# Patient Record
Sex: MALE | Race: WHITE | NOT HISPANIC OR LATINO | Employment: FULL TIME | URBAN - METROPOLITAN AREA
[De-identification: names, ages, dates, MRNs, and addresses within clinical notes are randomized per-mention and may not be internally consistent; named-entity substitution may affect disease eponyms.]

---

## 2017-11-05 ENCOUNTER — OFFICE VISIT (OUTPATIENT)
Dept: URGENT CARE | Facility: CLINIC | Age: 37
End: 2017-11-05
Payer: COMMERCIAL

## 2017-11-05 VITALS
OXYGEN SATURATION: 96 % | DIASTOLIC BLOOD PRESSURE: 68 MMHG | WEIGHT: 315 LBS | HEIGHT: 72 IN | TEMPERATURE: 98 F | SYSTOLIC BLOOD PRESSURE: 110 MMHG | RESPIRATION RATE: 18 BRPM | BODY MASS INDEX: 42.66 KG/M2 | HEART RATE: 91 BPM

## 2017-11-05 DIAGNOSIS — M54.32 SCIATICA OF LEFT SIDE: ICD-10-CM

## 2017-11-05 PROCEDURE — 99204 OFFICE O/P NEW MOD 45 MIN: CPT | Mod: 25 | Performed by: PHYSICIAN ASSISTANT

## 2017-11-05 RX ORDER — KETOROLAC TROMETHAMINE 30 MG/ML
60 INJECTION, SOLUTION INTRAMUSCULAR; INTRAVENOUS ONCE
Status: COMPLETED | OUTPATIENT
Start: 2017-11-05 | End: 2017-11-05

## 2017-11-05 RX ORDER — CYCLOBENZAPRINE HCL 10 MG
10 TABLET ORAL 3 TIMES DAILY PRN
Qty: 30 TAB | Refills: 0 | Status: SHIPPED | OUTPATIENT
Start: 2017-11-05

## 2017-11-05 RX ORDER — HYDROXYZINE HYDROCHLORIDE 25 MG/1
25 TABLET, FILM COATED ORAL 3 TIMES DAILY PRN
COMMUNITY

## 2017-11-05 RX ORDER — PREDNISONE 20 MG/1
TABLET ORAL
Qty: 10 TAB | Refills: 0 | Status: SHIPPED | OUTPATIENT
Start: 2017-11-05

## 2017-11-05 RX ORDER — ATORVASTATIN CALCIUM 20 MG/1
20 TABLET, FILM COATED ORAL NIGHTLY
COMMUNITY

## 2017-11-05 RX ORDER — LISINOPRIL 20 MG/1
20 TABLET ORAL DAILY
COMMUNITY

## 2017-11-05 RX ORDER — HYDROCODONE BITARTRATE AND ACETAMINOPHEN 5; 325 MG/1; MG/1
1 TABLET ORAL EVERY 6 HOURS PRN
Qty: 15 TAB | Refills: 0 | Status: SHIPPED | OUTPATIENT
Start: 2017-11-05

## 2017-11-05 RX ORDER — FLUTICASONE PROPIONATE 50 MCG
1 SPRAY, SUSPENSION (ML) NASAL DAILY
COMMUNITY

## 2017-11-05 RX ADMIN — KETOROLAC TROMETHAMINE 60 MG: 30 INJECTION, SOLUTION INTRAMUSCULAR; INTRAVENOUS at 12:10

## 2017-11-05 ASSESSMENT — ENCOUNTER SYMPTOMS
HEADACHES: 0
TINGLING: 1
MYALGIAS: 1
VOMITING: 0
WEAKNESS: 0
BOWEL INCONTINENCE: 0
CHILLS: 0
FOCAL WEAKNESS: 0
NAUSEA: 0
FEVER: 0
NUMBNESS: 0
BACK PAIN: 1
SENSORY CHANGE: 0
FLANK PAIN: 0
COUGH: 0
PERIANAL NUMBNESS: 0
PALPITATIONS: 0
SHORTNESS OF BREATH: 0
ABDOMINAL PAIN: 0
LEG PAIN: 1
PARESIS: 0
PARESTHESIAS: 0
DIZZINESS: 0

## 2017-11-05 NOTE — PROGRESS NOTES
Subjective:      Rao Paulson is a 37 y.o. male who presents with Other (Left side sciatic pain x3 days)            Back Pain   This is a new problem. Episode onset: 3 days. The problem occurs constantly. The problem has been gradually worsening since onset. The pain is present in the lumbar spine and gluteal (left lumbar spina and left gluteal region). The quality of the pain is described as aching. Radiates to: down left leg. The pain is at a severity of 10/10. The symptoms are aggravated by sitting and bending. Associated symptoms include leg pain and tingling (tingling down left leg). Pertinent negatives include no abdominal pain, bladder incontinence, bowel incontinence, chest pain, dysuria, fever, headaches, numbness, paresis, paresthesias, perianal numbness or weakness. Risk factors include obesity. He has tried NSAIDs, chiropractic manipulation and ice for the symptoms. The treatment provided no relief.     The patient's BP is elevated today due to his severe pain. He denies any symptoms related to his BP. He denies headache, nausea, vision changes, chest pain, shortness of breath, lower extremity edema.     Past Medical History:   Diagnosis Date   • Anxiety    • Hypercholesterolemia      History reviewed. No pertinent surgical history.    History reviewed. No pertinent family history.    Allergies   Allergen Reactions   • Codeine Vomiting       Medications, Allergies, and current problem list reviewed today in Epic      Review of Systems   Constitutional: Negative for chills, fever and malaise/fatigue.   HENT: Negative for ear pain.    Respiratory: Negative for cough and shortness of breath.    Cardiovascular: Negative for chest pain, palpitations and leg swelling.   Gastrointestinal: Negative for abdominal pain, bowel incontinence, nausea and vomiting.   Genitourinary: Negative for bladder incontinence, dysuria and flank pain.        No urinary or bowel incontinence   Musculoskeletal: Positive for back  pain and myalgias.   Neurological: Positive for tingling (tingling down left leg). Negative for dizziness, sensory change, focal weakness, weakness, numbness, headaches and paresthesias.     All other systems reviewed and are negative.        Objective:     /68   Pulse 91   Temp 36.7 °C (98 °F)   Resp 18   Ht 1.829 m (6')   Wt (!) 175.5 kg (387 lb)   SpO2 96%   BMI 52.49 kg/m²      Physical Exam   Constitutional: He is oriented to person, place, and time. He appears well-developed. He appears distressed (patient appears to be in a lot of pain).   Morbidly obese   Eyes: Conjunctivae and EOM are normal. Pupils are equal, round, and reactive to light.   Cardiovascular: Normal rate, regular rhythm and normal heart sounds.  Exam reveals no gallop and no friction rub.    No murmur heard.  Pulmonary/Chest: Effort normal and breath sounds normal. No respiratory distress. He has no wheezes. He has no rales.   Musculoskeletal:        Lumbar back: He exhibits decreased range of motion (marked limitation with forward flexion and extension due to pain ), tenderness (marked TTP over left SI joint), pain and spasm. He exhibits no bony tenderness, no swelling, no edema and normal pulse.   Pain with palpation over left gluteal region overlying sciatic nerve. Lower extremities with FROM and distal n/c intact.   Neurological: He is alert and oriented to person, place, and time. No cranial nerve deficit.   Psychiatric: He has a normal mood and affect. His behavior is normal. Judgment and thought content normal.               Assessment/Plan:     1. Sciatica of left side  - patient improved significantly after Toradol injection. BP improved back to normal  - ketorolac (TORADOL) injection 60 mg; 2 mL by Intramuscular route Once.      Current Outpatient Prescriptions:   •  predniSONE (DELTASONE) 20 MG Tab, 2 tabs by mouth daily x 5 days, Disp: 10 Tab, Rfl: 0  •  cyclobenzaprine (FLEXERIL) 10 MG Tab, Take 1 Tab by mouth 3  times a day as needed., Disp: 30 Tab, Rfl: 0  •  hydrocodone-acetaminophen (NORCO) 5-325 MG Tab per tablet, Take 1 Tab by mouth every 6 hours as needed. For severe pain 7-10., Disp: 15 Tab, Rfl: 0  Sedation side effects discussed. No Driving or alcohol with medication given.    [facility] Aware web site evaluation: I have obtained and reviewed patient utilization report from [facility] pharmacy database prior to writing prescription for controlled substance II, III or IV per [place] bill . Based on the report and my clinical assessment the prescription is medically necessary.    Advised patient to not take Norco and Flexeril together. Wait at least 4-6 hours between taking either of the medication.  Continue heat, ice, rest, massage.     Differential diagnoses, Supportive care, and indications for immediate follow-up discussed with patient.   Instructed to return to clinic or nearest emergency department for any change in condition, further concerns, or worsening of symptoms.    The patient demonstrated a good understanding and agreed with the treatment plan.    Jennifer Rose P.A.-C.

## 2018-03-10 ENCOUNTER — HOSPITAL ENCOUNTER (EMERGENCY)
Facility: MEDICAL CENTER | Age: 38
End: 2018-03-10
Attending: EMERGENCY MEDICINE
Payer: COMMERCIAL

## 2018-03-10 VITALS
HEART RATE: 104 BPM | DIASTOLIC BLOOD PRESSURE: 81 MMHG | SYSTOLIC BLOOD PRESSURE: 144 MMHG | TEMPERATURE: 96.6 F | OXYGEN SATURATION: 96 % | BODY MASS INDEX: 42.66 KG/M2 | WEIGHT: 315 LBS | RESPIRATION RATE: 16 BRPM | HEIGHT: 72 IN
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
## 2018-03-10 DIAGNOSIS — T18.128A FOOD IMPACTION OF ESOPHAGUS, INITIAL ENCOUNTER: ICD-10-CM

## 2018-03-10 PROCEDURE — 96375 TX/PRO/DX INJ NEW DRUG ADDON: CPT

## 2018-03-10 PROCEDURE — 36415 COLL VENOUS BLD VENIPUNCTURE: CPT

## 2018-03-10 PROCEDURE — 700111 HCHG RX REV CODE 636 W/ 250 OVERRIDE (IP): Performed by: EMERGENCY MEDICINE

## 2018-03-10 PROCEDURE — 96374 THER/PROPH/DIAG INJ IV PUSH: CPT

## 2018-03-10 PROCEDURE — 99284 EMERGENCY DEPT VISIT MOD MDM: CPT

## 2018-03-10 PROCEDURE — 700111 HCHG RX REV CODE 636 W/ 250 OVERRIDE (IP): Performed by: INTERNAL MEDICINE

## 2018-03-10 RX ORDER — OMEPRAZOLE 20 MG/1
20 TABLET, DELAYED RELEASE ORAL DAILY
Qty: 30 TAB | Refills: 0 | Status: SHIPPED | OUTPATIENT
Start: 2018-03-10

## 2018-03-10 RX ORDER — ONDANSETRON 2 MG/ML
4 INJECTION INTRAMUSCULAR; INTRAVENOUS ONCE
Status: COMPLETED | OUTPATIENT
Start: 2018-03-10 | End: 2018-03-10

## 2018-03-10 RX ADMIN — GLUCAGON HYDROCHLORIDE 1 MG: 1 INJECTION, POWDER, FOR SOLUTION INTRAMUSCULAR; INTRAVENOUS; SUBCUTANEOUS at 21:27

## 2018-03-10 RX ADMIN — ONDANSETRON 4 MG: 2 INJECTION INTRAMUSCULAR; INTRAVENOUS at 21:16

## 2018-03-11 NOTE — ED PROVIDER NOTES
"ED Provider Note    Scribed for Jack Smiley M.D. by Shayna Headley. 3/10/2018, 8:48 PM.    Primary care provider: Isaak Patricia M.D. (Inactive)  Means of arrival: walk in  History obtained from: patient   History limited by: none     CHIEF COMPLAINT  Chief Complaint   Patient presents with   • Blocked Esophagus     Pt states, \"I've got food stuck in my throat.\"       HPI  Rao Paulson is a 37 y.o. male who presents to the Emergency Department with complaints of difficulty swallowing onset 7PM this evening. He reports associated nausea. Patient is unable to tolerate secretions and reports spitting his secretions up. Patient states he was seen last week as he had chicken stuck in his throat and was given Glucagon with relief. He has been following a soft diet since. Today, he reports eating a rib eye steak which exacerbated his symptoms. He reports \"he feels like he has food stuck in his throat and like he is choking\". No complaints of vomiting or difficulty breathing. Patient has a history of acid reflux, diabetes, and hypertension. No history of asthma or COPD. No other acute complaints or concerns.     REVIEW OF SYSTEMS  Pertinent positives include difficulty swallowing, nausea. Pertinent negatives include vomiting or difficulty breathing.   All other systems negative.  C    PAST MEDICAL HISTORY   has a past medical history of Anxiety and Hypercholesterolemia.    SURGICAL HISTORY  patient denies any surgical history    SOCIAL HISTORY  Social History   Substance Use Topics   • Smoking status: Never Smoker   • Smokeless tobacco: Never Used      Comment: occasional cigars in the past   • Alcohol use No      Comment: quit 1 yr ago      History   Drug Use No       FAMILY HISTORY  No family history noted    CURRENT MEDICATIONS  Current medications can be reviewed in the nurse's note.     ALLERGIES  Allergies   Allergen Reactions   • Codeine Vomiting       PHYSICAL EXAM  VITAL SIGNS: /81   Pulse (!) " 112   Temp 35.9 °C (96.6 °F)   Resp 16   Ht 1.829 m (6')   Wt (!) 164 kg (361 lb 8.9 oz)   SpO2 89%   BMI 49.04 kg/m²     Constitutional: Well developed, Well nourished, mild distress. Obese male.   HENT: Normocephalic, Oropharynx moist. Mallampati class 1. Patient is not able to swallow his secretions, he is actively spitting his secretions into a bag.  Eyes: Conjunctiva normal, No discharge.   Cardiovascular: Normal heart rate, Normal rhythm, No murmurs, equal pulses.   Pulmonary: Normal breath sounds, No respiratory distress, No wheezing, No rales, No rhonchi.  Chest: No chest wall tenderness or deformity.   Abdomen:Soft, obese abdomen, No tenderness, No masses, no rebound, no guarding.   Back: No CVA tenderness.   Musculoskeletal: No major deformities noted, No tenderness.   Skin: Warm, Dry, No erythema, No rash.   Neurologic: Alert & oriented x 3, Normal motor function,  No focal deficits noted.   Psychiatric: Affect normal, Judgment normal, Mood normal.     COURSE & MEDICAL DECISION MAKING  Pertinent Labs & Imaging studies reviewed. (See chart for details)    8:48 PM - Patient seen and examined at bedside. Patient will be treated with Glucagon 1 mg, Zofran 4 mg. The differential diagnoses include but are not limited to: food impaction, esophagitis     9:26 PM- Patient is still waiting to receive his does of Glucagon.     9:52 PM- Re-evaluation: patient received his dose of Glucagon and feels improved. He is stable for discharge. Encouraged him to follow a soft diet and to see a GI specialist for further observation. Instructed the patient on return to ED precautions and he agrees to be discharged home.     Medical Decision Making: At this point time patient has passed the fluid bolus and has been able to tolerate oral fluids without any difficulty. I think he may have some esophagitis causing his symptoms therefore I'll change him off ranitidine and put him on Prilosec I discussed with the patient that  he needs to stay on a soft diet for at least 2 weeks and needs a follow-up with GI for endoscopy. Patient understands as he does not have any other complaints at this point time and will be discharged.    The patient will return for new or worsening symptoms and is stable at the time of discharge.    DISPOSITION:  Patient will be discharged home in stable condition.    FOLLOW UP:  Isaak Patricia M.D.  [address]  [address]  [facility] [id number] [phone]    Schedule an appointment as soon as possible for a visit in 3 days      Reymundo Ozuna M.D.  [address]  [facility] [address]  [phone]    Schedule an appointment as soon as possible for a visit  YOu may need a scope.       OUTPATIENT MEDICATIONS:  Discharge Medication List as of 3/10/2018  9:57 PM      START taking these medications    Details   omeprazole (PRILOSEC OTC) 20 MG tablet Take 1 Tab by mouth every day., Disp-30 Tab, R-0, Print Rx Paper            FINAL IMPRESSION  1. Food impaction of esophagus, initial encounter        Shayna BLAKE ([place]), am scribing for, and in the presence of, Jack Smiley M.D.    Electronically signed by: Shayna Headley ([place]), 3/10/2018    Jack BLAKE M.D. personally performed the services described in this documentation, as scribed by Shayna Headley in my presence, and it is both accurate and complete.    The note accurately reflects work and decisions made by me.  Jack Smiley  3/11/2018  3:19 AM

## 2018-03-11 NOTE — DISCHARGE INSTRUCTIONS
Soft diet for the next two weeks. Take the prilosec daily instead of the ranitidine. Return if you have more food stuck, chest pain, shortness of breath, or vomiting.   Swallowed Foreign Body, Adult  Introduction  A swallowed foreign body means that you swallow something and it gets stuck. It might be food or something else. The object may get stuck in the tube that connects your throat to your stomach (esophagus), or it may get stuck in another part of your belly (digestive tract). It is very important to tell your doctor what you have swallowed.  Sometimes, the object will pass through your body on its own. Sometimes, the object will pass after you are given a medicine to relax your throat. The object may need to be taken out by a doctor if it is dangerous or if it will not pass through your body on its own. An object may need to be removed with surgery if:  · It gets stuck in your throat.  · You cannot swallow.  · You cannot breathe well.  · It is sharp.  · It is harmful or poisonous (toxic), like batteries or drugs.  Follow these instructions at home:  · Eat what you normally eat if your doctor says that you can.  · Keep checking your poop (stool) to see if the object has come out of your body.  · Call your doctor if the object has not come out of your body after 3 days.  If you had surgery (endoscopy) to remove the object:  · Care for yourself after surgery as told by your doctor.  Keep all follow-up visits as told by your doctor. This is important.  Contact a doctor if:  · You still have problems after you have been treated.  · The object has not come out of your body after 3 days.  Get help right away if:  · You have a fever.  · You have pain in your chest or your belly.  · You cough up blood.  · You have blood in your poop or your throw up (vomit).  This information is not intended to replace advice given to you by your health care provider. Make sure you discuss any questions you have with your health care  provider.  Document Released: 04/03/2012 Document Revised: 05/25/2017 Document Reviewed: 03/16/2016  © 2017 [employer]

## 2018-03-11 NOTE — ED NOTES
"Pt states \"I feel like there is food stuck in my throat.\"     Started to cough into the sink and claims that he coughed something up before RN entered the room.  "

## 2018-03-11 NOTE — ED TRIAGE NOTES
"Rao Paulson  37 y.o. male  Chief Complaint   Patient presents with   • Blocked Esophagus     Pt states, \"I've got food stuck in my throat.\"        Pt amb to triage with steady gait for above complaint. Pt reports pain and difficulty swallowing but is able to manage secretions.  Pt reports same complaint aprox 1 week ago. Pt states, \"Last time they just gave me Glucagon and it worked.\"  Pt is alert and oriented, speaking in full sentences, follows commands and responds appropriately to questions. NAD. Resp are even and unlabored.  Pt placed in lobby. Pt educated on triage process. Pt encouraged to alert staff for any changes.    "

## 2018-03-11 NOTE — ED NOTES
.All lines and monitors discontinued. Discharge instructions given, questions answered.    Ambulated out of ER, escorted by Spouse.  Instructed not to drive after taking pain medication and pt verbalizes understanding.  Rx x 1 given.

## 2024-09-03 ENCOUNTER — APPOINTMENT (RX ONLY)
Dept: URBAN - METROPOLITAN AREA CLINIC 31 | Facility: CLINIC | Age: 44
Setting detail: DERMATOLOGY
End: 2024-09-03

## 2024-09-03 DIAGNOSIS — D22 MELANOCYTIC NEVI: ICD-10-CM

## 2024-09-03 DIAGNOSIS — L82.1 OTHER SEBORRHEIC KERATOSIS: ICD-10-CM

## 2024-09-03 DIAGNOSIS — L81.4 OTHER MELANIN HYPERPIGMENTATION: ICD-10-CM

## 2024-09-03 DIAGNOSIS — Z71.89 OTHER SPECIFIED COUNSELING: ICD-10-CM

## 2024-09-03 DIAGNOSIS — D18.0 HEMANGIOMA: ICD-10-CM

## 2024-09-03 DIAGNOSIS — L91.8 OTHER HYPERTROPHIC DISORDERS OF THE SKIN: ICD-10-CM

## 2024-09-03 PROBLEM — D22.39 MELANOCYTIC NEVI OF OTHER PARTS OF FACE: Status: ACTIVE | Noted: 2024-09-03

## 2024-09-03 PROBLEM — D22.62 MELANOCYTIC NEVI OF LEFT UPPER LIMB, INCLUDING SHOULDER: Status: ACTIVE | Noted: 2024-09-03

## 2024-09-03 PROBLEM — D22.61 MELANOCYTIC NEVI OF RIGHT UPPER LIMB, INCLUDING SHOULDER: Status: ACTIVE | Noted: 2024-09-03

## 2024-09-03 PROBLEM — D22.71 MELANOCYTIC NEVI OF RIGHT LOWER LIMB, INCLUDING HIP: Status: ACTIVE | Noted: 2024-09-03

## 2024-09-03 PROBLEM — D22.72 MELANOCYTIC NEVI OF LEFT LOWER LIMB, INCLUDING HIP: Status: ACTIVE | Noted: 2024-09-03

## 2024-09-03 PROBLEM — D18.01 HEMANGIOMA OF SKIN AND SUBCUTANEOUS TISSUE: Status: ACTIVE | Noted: 2024-09-03

## 2024-09-03 PROBLEM — D22.5 MELANOCYTIC NEVI OF TRUNK: Status: ACTIVE | Noted: 2024-09-03

## 2024-09-03 PROCEDURE — 99203 OFFICE O/P NEW LOW 30 MIN: CPT

## 2024-09-03 PROCEDURE — ? DEFER

## 2024-09-03 PROCEDURE — ? COUNSELING

## 2024-09-03 ASSESSMENT — LOCATION SIMPLE DESCRIPTION DERM
LOCATION SIMPLE: RIGHT CALF
LOCATION SIMPLE: LEFT CALF
LOCATION SIMPLE: RIGHT LOWER BACK
LOCATION SIMPLE: LEFT CHEEK
LOCATION SIMPLE: RIGHT FOREARM
LOCATION SIMPLE: LEFT FOREARM
LOCATION SIMPLE: RIGHT HAND
LOCATION SIMPLE: LEFT THIGH
LOCATION SIMPLE: LEFT ANTERIOR NECK
LOCATION SIMPLE: RIGHT THIGH
LOCATION SIMPLE: RIGHT UPPER ARM
LOCATION SIMPLE: RIGHT FOREHEAD
LOCATION SIMPLE: LEFT HAND
LOCATION SIMPLE: ABDOMEN
LOCATION SIMPLE: RIGHT UPPER BACK
LOCATION SIMPLE: LEFT UPPER ARM
LOCATION SIMPLE: RIGHT CHEEK
LOCATION SIMPLE: LEFT SHOULDER
LOCATION SIMPLE: RIGHT SHOULDER
LOCATION SIMPLE: LEFT INFERIOR EYELID
LOCATION SIMPLE: UPPER BACK

## 2024-09-03 ASSESSMENT — LOCATION DETAILED DESCRIPTION DERM
LOCATION DETAILED: LEFT VENTRAL DISTAL FOREARM
LOCATION DETAILED: INFERIOR THORACIC SPINE
LOCATION DETAILED: RIGHT PROXIMAL DORSAL FOREARM
LOCATION DETAILED: LEFT RADIAL DORSAL HAND
LOCATION DETAILED: LEFT LATERAL INFERIOR EYELID
LOCATION DETAILED: PERIUMBILICAL SKIN
LOCATION DETAILED: RIGHT POSTERIOR SHOULDER
LOCATION DETAILED: LEFT PROXIMAL DORSAL FOREARM
LOCATION DETAILED: LEFT CLAVICULAR NECK
LOCATION DETAILED: LEFT POSTERIOR SHOULDER
LOCATION DETAILED: LEFT PROXIMAL POSTERIOR UPPER ARM
LOCATION DETAILED: RIGHT ULNAR DORSAL HAND
LOCATION DETAILED: RIGHT SUPERIOR MEDIAL MIDBACK
LOCATION DETAILED: RIGHT PROXIMAL CALF
LOCATION DETAILED: RIGHT ANTERIOR DISTAL THIGH
LOCATION DETAILED: LEFT PROXIMAL CALF
LOCATION DETAILED: RIGHT CENTRAL MALAR CHEEK
LOCATION DETAILED: RIGHT VENTRAL DISTAL FOREARM
LOCATION DETAILED: EPIGASTRIC SKIN
LOCATION DETAILED: LEFT ANTERIOR DISTAL UPPER ARM
LOCATION DETAILED: LEFT ANTERIOR DISTAL THIGH
LOCATION DETAILED: LEFT LATERAL MALAR CHEEK
LOCATION DETAILED: RIGHT INFERIOR LATERAL FOREHEAD
LOCATION DETAILED: RIGHT ANTERIOR DISTAL UPPER ARM
LOCATION DETAILED: RIGHT PROXIMAL POSTERIOR UPPER ARM
LOCATION DETAILED: RIGHT INFERIOR MEDIAL UPPER BACK

## 2024-09-03 ASSESSMENT — LOCATION ZONE DERM
LOCATION ZONE: ARM
LOCATION ZONE: TRUNK
LOCATION ZONE: NECK
LOCATION ZONE: HAND
LOCATION ZONE: FACE
LOCATION ZONE: LEG
LOCATION ZONE: EYELID

## 2024-12-17 ENCOUNTER — APPOINTMENT (OUTPATIENT)
Dept: URBAN - METROPOLITAN AREA CLINIC 31 | Facility: CLINIC | Age: 44
Setting detail: DERMATOLOGY
End: 2024-12-17

## 2024-12-17 DIAGNOSIS — D485 NEOPLASM OF UNCERTAIN BEHAVIOR OF SKIN: ICD-10-CM

## 2024-12-17 PROBLEM — D48.5 NEOPLASM OF UNCERTAIN BEHAVIOR OF SKIN: Status: ACTIVE | Noted: 2024-12-17

## 2024-12-17 PROCEDURE — 11102 TANGNTL BX SKIN SINGLE LES: CPT

## 2024-12-17 PROCEDURE — ? BIOPSY BY SHAVE METHOD

## 2024-12-17 ASSESSMENT — LOCATION DETAILED DESCRIPTION DERM: LOCATION DETAILED: RIGHT PROXIMAL MEDIAL POSTERIOR THIGH

## 2024-12-17 ASSESSMENT — LOCATION ZONE DERM: LOCATION ZONE: LEG

## 2024-12-17 ASSESSMENT — LOCATION SIMPLE DESCRIPTION DERM: LOCATION SIMPLE: RIGHT POSTERIOR THIGH

## 2024-12-17 NOTE — PROCEDURE: BIOPSY BY SHAVE METHOD
Detail Level: Detailed
Depth Of Biopsy: dermis
Was A Bandage Applied: Yes
Size Of Lesion In Cm: 0
Biopsy Type: H and E
Biopsy Method: Dermablade
Anesthesia Type: 0.5% lidocaine without epinephrine
Anesthesia Volume In Cc: 0.5
Hemostasis: Drysol
Wound Care: Petrolatum
Dressing: bandage
Destruction After The Procedure: No
Type Of Destruction Used: Curettage
Curettage Text: The wound bed was treated with curettage after the biopsy was performed.
Cryotherapy Text: The wound bed was treated with cryotherapy after the biopsy was performed.
Electrodesiccation Text: The wound bed was treated with electrodesiccation after the biopsy was performed.
Electrodesiccation And Curettage Text: The wound bed was treated with electrodesiccation and curettage after the biopsy was performed.
Silver Nitrate Text: The wound bed was treated with silver nitrate after the biopsy was performed.
Lab: [id number]
Lab Facility: 
Medical Necessity Information: It is in your best interest to select a reason for this procedure from the list below. All of these items fulfill various CMS LCD requirements except the new and changing color options.
Consent: Written consent was obtained and risks were reviewed including but not limited to scarring, infection, bleeding, scabbing, incomplete removal, nerve damage and allergy to anesthesia.
Post-Care Instructions: I reviewed with the patient in detail post-care instructions. Patient is to keep the biopsy site dry overnight, and then apply bacitracin twice daily until healed. Patient may apply hydrogen peroxide soaks to remove any crusting.
Notification Instructions: Patient will be notified of biopsy results. However, patient instructed to call the office if not contacted within 2 weeks.
Billing Type: Third-Party Bill
Information: Selecting Yes will display possible errors in your note based on the variables you have selected. This validation is only offered as a suggestion for you. PLEASE NOTE THAT THE VALIDATION TEXT WILL BE REMOVED WHEN YOU FINALIZE YOUR NOTE. IF YOU WANT TO FAX A PRELIMINARY NOTE YOU WILL NEED TO TOGGLE THIS TO 'NO' IF YOU DO NOT WANT IT IN YOUR FAXED NOTE.

## 2025-01-08 ENCOUNTER — APPOINTMENT (OUTPATIENT)
Dept: URBAN - METROPOLITAN AREA CLINIC 31 | Facility: CLINIC | Age: 45
Setting detail: DERMATOLOGY
End: 2025-01-08

## 2025-01-08 DIAGNOSIS — L91.8 OTHER HYPERTROPHIC DISORDERS OF THE SKIN: ICD-10-CM

## 2025-01-08 PROCEDURE — ? COUNSELING

## 2025-01-08 PROCEDURE — ? BENIGN DESTRUCTION COSMETIC

## 2025-01-08 ASSESSMENT — LOCATION ZONE DERM: LOCATION ZONE: NECK

## 2025-01-08 ASSESSMENT — LOCATION DETAILED DESCRIPTION DERM
LOCATION DETAILED: LEFT INFERIOR POSTERIOR NECK
LOCATION DETAILED: LEFT CLAVICULAR NECK
LOCATION DETAILED: LEFT LATERAL TRAPEZIAL NECK
LOCATION DETAILED: LEFT SUPERIOR ANTERIOR NECK
LOCATION DETAILED: LEFT INFERIOR LATERAL NECK

## 2025-01-08 ASSESSMENT — LOCATION SIMPLE DESCRIPTION DERM
LOCATION SIMPLE: POSTERIOR NECK
LOCATION SIMPLE: LEFT ANTERIOR NECK

## 2025-01-08 NOTE — PROCEDURE: BENIGN DESTRUCTION COSMETIC
Detail Level: Detailed
Post-Care Instructions: I reviewed with the patient in detail post-care instructions. Patient is to wear sunprotection, and avoid picking at any of the treated lesions. Pt may apply Vaseline to crusted or scabbing areas.
Consent: The patient's consent was obtained including but not limited to risks of crusting, scabbing, blistering, scarring, darker or lighter pigmentary change, recurrence, incomplete removal and infection.
Price (Use Numbers Only, No Special Characters Or $): 160
Anesthesia Type: 2% Xylocaine with 1:100,000 epinephrine
Anesthesia Volume In Cc: 2

## 2025-01-16 ENCOUNTER — APPOINTMENT (OUTPATIENT)
Dept: URBAN - METROPOLITAN AREA CLINIC 31 | Facility: CLINIC | Age: 45
Setting detail: DERMATOLOGY
End: 2025-01-16

## 2025-01-16 DIAGNOSIS — L91.8 OTHER HYPERTROPHIC DISORDERS OF THE SKIN: ICD-10-CM

## 2025-01-16 PROCEDURE — ? BENIGN DESTRUCTION COSMETIC MULTI

## 2025-01-16 ASSESSMENT — LOCATION DETAILED DESCRIPTION DERM: LOCATION DETAILED: RIGHT CLAVICULAR NECK

## 2025-01-16 ASSESSMENT — LOCATION SIMPLE DESCRIPTION DERM: LOCATION SIMPLE: RIGHT ANTERIOR NECK

## 2025-01-16 ASSESSMENT — LOCATION ZONE DERM: LOCATION ZONE: NECK

## 2025-01-16 NOTE — PROCEDURE: BENIGN DESTRUCTION COSMETIC MULTI
Total Number Of Lesions Treated: 15
Anesthesia Type: 0.05% lidocaine without epinephrine
Consent: The patient's consent was obtained including but not limited to risks of crusting, scabbing, blistering, scarring, darker or lighter pigmentary change, recurrence, incomplete removal and infection.
Detail Level: Detailed
Anesthesia Volume In Cc: 0.5
Price (Use Numbers Only, No Special Characters Or $): 160
Post-Care Instructions: I reviewed with the patient in detail post-care instructions. Patient is to wear sunprotection, and avoid picking at any of the treated lesions. Pt may apply Vaseline to crusted or scabbing areas.

## 2025-04-17 ENCOUNTER — HOSPITAL ENCOUNTER (OUTPATIENT)
Dept: RADIOLOGY | Facility: MEDICAL CENTER | Age: 45
End: 2025-04-17
Attending: NURSE PRACTITIONER
Payer: COMMERCIAL

## 2025-04-17 DIAGNOSIS — R14.0 BLOATING: ICD-10-CM

## 2025-04-17 DIAGNOSIS — K76.0 NON-ALCOHOLIC FATTY LIVER DISEASE: ICD-10-CM

## 2025-04-17 DIAGNOSIS — G47.30 SLEEP APNEA IN ADULT: ICD-10-CM

## 2025-04-17 PROCEDURE — 76705 ECHO EXAM OF ABDOMEN: CPT

## 2025-05-14 ENCOUNTER — APPOINTMENT (OUTPATIENT)
Dept: RADIOLOGY | Facility: MEDICAL CENTER | Age: 45
End: 2025-05-14
Attending: NURSE PRACTITIONER
Payer: COMMERCIAL

## 2025-05-14 DIAGNOSIS — K76.0 FATTY METAMORPHOSIS OF LIVER: ICD-10-CM

## 2025-05-14 DIAGNOSIS — J91.8 PLEURAL EFFUSION ASSOCIATED WITH HEPATIC DISORDER: ICD-10-CM

## 2025-05-14 DIAGNOSIS — K76.9 PLEURAL EFFUSION ASSOCIATED WITH HEPATIC DISORDER: ICD-10-CM

## 2025-05-14 PROCEDURE — A9581 GADOXETATE DISODIUM INJ: HCPCS | Performed by: NURSE PRACTITIONER

## 2025-05-14 PROCEDURE — 74183 MRI ABD W/O CNTR FLWD CNTR: CPT

## 2025-05-14 PROCEDURE — 700117 HCHG RX CONTRAST REV CODE 255: Performed by: NURSE PRACTITIONER

## 2025-05-14 RX ADMIN — GADOXETATE DISODIUM 15 ML: 181.43 INJECTION, SOLUTION INTRAVENOUS at 15:39
